# Patient Record
Sex: FEMALE | Race: ASIAN | NOT HISPANIC OR LATINO | Employment: OTHER | URBAN - METROPOLITAN AREA
[De-identification: names, ages, dates, MRNs, and addresses within clinical notes are randomized per-mention and may not be internally consistent; named-entity substitution may affect disease eponyms.]

---

## 2023-07-13 ENCOUNTER — OFFICE VISIT (OUTPATIENT)
Dept: NEUROLOGY | Facility: CLINIC | Age: 73
End: 2023-07-13
Payer: MEDICARE

## 2023-07-13 VITALS — HEART RATE: 80 BPM | SYSTOLIC BLOOD PRESSURE: 116 MMHG | DIASTOLIC BLOOD PRESSURE: 62 MMHG

## 2023-07-13 DIAGNOSIS — R26.9 UNSPECIFIED ABNORMALITIES OF GAIT AND MOBILITY: ICD-10-CM

## 2023-07-13 DIAGNOSIS — M48.07 SPINAL STENOSIS OF LUMBOSACRAL REGION: Primary | ICD-10-CM

## 2023-07-13 PROCEDURE — 99205 OFFICE O/P NEW HI 60 MIN: CPT | Performed by: PSYCHIATRY & NEUROLOGY

## 2023-07-13 RX ORDER — GABAPENTIN 100 MG/1
100 CAPSULE ORAL 3 TIMES DAILY
Qty: 90 CAPSULE | Refills: 5 | Status: SHIPPED | OUTPATIENT
Start: 2023-07-13

## 2023-07-13 RX ORDER — TRAZODONE HYDROCHLORIDE 50 MG/1
TABLET ORAL
COMMUNITY
Start: 2023-06-22

## 2023-07-13 NOTE — ASSESSMENT & PLAN NOTE
Over the past 6 months she has noted subtle changes in handwriting which has become small and sloppy, speech which she feels is a bit softer, and gait. She has imbalance and has fallen 2-3 times over. She describes periods at home where she takes shorter strides and that takes her longer to turn. Symptoms described are suggestive of parkinsonism but this was not confirmed by clinical exam.  There is no signs of rigidity, tremor, or asymmetric bradykinesia. She does not have any history of REM sleep behavior disorder or restless legs. There is been no changes in olfaction. In addition gait and balance issues may be in part related to her lumbar pathology and right lower extremity neuropathic pain. We discussed the clinical diagnosis of Parkinson's disease of which she does not meet at this point. I did recommend she follow-up in a few months to assess for any progression of symptoms that would be more suggestive of an underlying diagnosis of PD. If this were the case then we could consider treatment to assess response or obtaining other adjunctive studies. More recent labs looking at B12, TSH, CMP, myasthenia gravis antibodies   were unremarkable.

## 2023-07-13 NOTE — PROGRESS NOTES
Patient ID: Agustín Lomeli is a 74 y.o. female. Assessment/Plan:    Spinal stenosis of lumbosacral region  Patient with a history of lumbar degenerative disease rating down the right leg for which she is undergone multiple epidural injections with resolution of lower back pain but continued pain in the anterolateral aspect of her right lower calf with involvement of the sole of the foot. Pain is persistent and varies in intensity. Worse with prolonged standing or walking. She has sought a second opinion at [facility] who ordered an EMG study which they wish to obtain locally. We will place order for EMG so that can be obtained locally. This is being done to confirm etiology of symptoms are from lumbar spine. Will start gabapentin 100 mg 3 times daily in effort to control neuropathic pain. Unspecified abnormalities of gait and mobility  Over the past 6 months she has noted subtle changes in handwriting which has become small and sloppy, speech which she feels is a bit softer, and gait. She has imbalance and has fallen 2-3 times over. She describes periods at home where she takes shorter strides and that takes her longer to turn. Symptoms described are suggestive of parkinsonism but this was not confirmed by clinical exam.  There is no signs of rigidity, tremor, or asymmetric bradykinesia. She does not have any history of REM sleep behavior disorder or restless legs. There is been no changes in olfaction. In addition gait and balance issues may be in part related to her lumbar pathology and right lower extremity neuropathic pain. We discussed the clinical diagnosis of Parkinson's disease of which she does not meet at this point. I did recommend she follow-up in a few months to assess for any progression of symptoms that would be more suggestive of an underlying diagnosis of PD. If this were the case then we could consider treatment to assess response or obtaining other adjunctive studies. More recent labs looking at B12, TSH, CMP, myasthenia gravis antibodies   were unremarkable. Diagnoses and all orders for this visit:    Spinal stenosis of lumbosacral region  -     gabapentin (Neurontin) 100 mg capsule; Take 1 capsule (100 mg total) by mouth 3 (three) times a day  -     EMG 2 limb lower extremity; Future    Unspecified abnormalities of gait and mobility  -     EMG 2 limb lower extremity; Future    Other orders  -     traZODone (DESYREL) 50 mg tablet; daily at bedtime       I have spent a total time of 60 minutes on 07/13/23 in caring for this patient including Patient and family education, Impressions, Counseling / Coordination of care, Documenting in the medical record, Reviewing / ordering tests, medicine, procedures   and Obtaining or reviewing history  . Subjective:    Lilian Araujo is a 74year old female who is referred for movement disorder evaluation for gait changes. She has has lower back pain radiating down her right leg for over 5 years. Overtime pain in back resolved but she continues to have pain on the lateral aspect of the right calf and sole foot. Pain is described as severe at times . It is better at rest and worse when walking and standing. She has undergone epidural injections (5 injections) which have not been effective. Physical therapy has not helped. She has noted changes in her gait with shorter stride with short turns and other changes over the past 6 months. She has fallen 2-3 times I over the past 6 months. Speech is soft. No difficulty chewing and swallowing. Dressing and hygiene acts performed independently. Handwriting is micrographic and sloppier. Sleeps well on trazodone. There have been no symptoms of REM sleep behavior disorder. There are no issues with urination. She has mild constipation. There are no experiences with lightheadedness on standing. Cognition is unchanged. She can forget why she entered a room.  There are no changes in olfaction. Objective:    Blood pressure 116/62, pulse 80. Physical Exam  Vitals reviewed. Eyes:      Extraocular Movements: Extraocular movements intact. Pupils: Pupils are equal, round, and reactive to light. Neurological:      Motor: Motor strength is normal.     Deep Tendon Reflexes:      Reflex Scores:       Tricep reflexes are 1+ on the right side and 1+ on the left side. Bicep reflexes are 2+ on the right side and 2+ on the left side. Patellar reflexes are 2+ on the right side and 2+ on the left side. Achilles reflexes are 2+ on the right side and 2+ on the left side. Psychiatric:         Speech: Speech normal.         Neurological Exam  Mental Status   Oriented to person, place, time and situation. Recent and remote memory are intact. Speech is normal. Follows complex commands. Attention and concentration are normal.    Cranial Nerves  CN III, IV, VI: Extraocular movements intact bilaterally. Pupils equal round and reactive to light bilaterally. CN VII: Full and symmetric facial movement. CN VIII: Hearing is normal.  CN IX, X: Palate elevates symmetrically  CN XI: Shoulder shrug strength is normal.  CN XII: Tongue midline without atrophy or fasciculations. Motor   Normal muscle tone. Strength is 5/5 throughout all four extremities. Sensory  Light touch is normal in upper and lower extremities. Pinprick is normal in upper and lower extremities. Reflexes                                            Right                      Left  Biceps                                 2+                         2+  Triceps                                1+                         1+  Patellar                                2+                         2+  Achilles                                2+                         2+    Right pathological reflexes: Ankle clonus absent. Left pathological reflexes: Ankle clonus absent.     Coordination  Right: Finger-to-nose normal. Rapid alternating movement normal.Left: Finger-to-nose normal. Rapid alternating movement normal.  Finger taps, hand , rapid alternating movements, and heel taps were normal in amplitude without decrement. Mild decrement with right foot taps 1. No rest or action tremor. .    Gait   Able to rise from chair without using arms. Good stride length. No en bloc turn. No freezing or festination. .        ROS:    Review of Systems   Constitutional: Negative for appetite change, fatigue and fever. HENT: Negative. Negative for hearing loss, tinnitus, trouble swallowing and voice change. Eyes: Negative. Negative for photophobia, pain and visual disturbance. Respiratory: Negative. Negative for shortness of breath. Cardiovascular: Negative. Negative for palpitations. Gastrointestinal: Negative. Negative for nausea and vomiting. Endocrine: Negative. Negative for cold intolerance. Genitourinary: Negative. Negative for dysuria, frequency and urgency. Musculoskeletal: Positive for gait problem ([place] steps). Negative for back pain, myalgias and neck pain. Balance Issues  Pain in Legs  Falls     Skin: Negative. Negative for rash. Allergic/Immunologic: Negative. Neurological: Positive for dizziness (Sometimes), weakness (Hands) and numbness. Negative for tremors, seizures, syncope, facial asymmetry, speech difficulty, light-headedness and headaches. Handwriting has gotten worse     Hematological: Negative. Does not bruise/bleed easily. Psychiatric/Behavioral: Negative. Negative for confusion, hallucinations and sleep disturbance. All other systems reviewed and are negative.

## 2023-07-13 NOTE — ASSESSMENT & PLAN NOTE
Patient with a history of lumbar degenerative disease rating down the right leg for which she is undergone multiple epidural injections with resolution of lower back pain but continued pain in the anterolateral aspect of her right lower calf with involvement of the sole of the foot. Pain is persistent and varies in intensity. Worse with prolonged standing or walking. She has sought a second opinion at [facility] who ordered an EMG study which they wish to obtain locally. We will place order for EMG so that can be obtained locally. This is being done to confirm etiology of symptoms are from lumbar spine. Will start gabapentin 100 mg 3 times daily in effort to control neuropathic pain.

## 2023-08-01 ENCOUNTER — HOSPITAL ENCOUNTER (OUTPATIENT)
Dept: NEUROLOGY | Facility: CLINIC | Age: 73
Discharge: HOME | End: 2023-08-01
Payer: MEDICARE

## 2023-08-01 DIAGNOSIS — M48.07 SPINAL STENOSIS OF LUMBOSACRAL REGION: ICD-10-CM

## 2023-08-01 DIAGNOSIS — R26.9 UNSPECIFIED ABNORMALITIES OF GAIT AND MOBILITY: ICD-10-CM

## 2023-08-01 PROCEDURE — 95910 NRV CNDJ TEST 7-8 STUDIES: CPT | Performed by: PSYCHIATRY & NEUROLOGY

## 2023-08-01 PROCEDURE — 95886 MUSC TEST DONE W/N TEST COMP: CPT | Performed by: PSYCHIATRY & NEUROLOGY

## 2023-11-07 ENCOUNTER — TELEPHONE (OUTPATIENT)
Dept: NEUROLOGY | Facility: CLINIC | Age: 73
End: 2023-11-07

## 2023-11-08 NOTE — TELEPHONE ENCOUNTER
Called pt. She placed her , Herminia Howard, on the phone. Herminia Howard stated that pt fell down twice in the past 2 weeks. No injury. Does not walk with an assistive device. Pt feels unsteady on her feet. Loses balance when she goes to make a turn. Denies dizziness. Denies tremors. Pt stopped taking gabapentin. Stated that it did not help, and it made her feel weak. Stopped the med after one month. Pt's lumbar pain has improved with physical therapy. If she develops back pain, she rests and the pain subsides. Pt verbalized to her  that she is afraid she may be having "mini strokes". Hreminia Howard would like pt evaluated by Dr. Angus Spurling. Routed to provider to advise if an OV is recommended, and how soon pt needs to be seen.

## 2023-11-08 NOTE — TELEPHONE ENCOUNTER
Sourav Fletcher MD  Neurology [facility] Clinical Team 452 minutes ago (12:27 PM)       History provided is not suggestive of mini stroke. Is she having new neurological symptoms? If this is the case leading her to believe she is having a stroke then she should be seen in ER. Patient is scheduled to follow up with Stephy Lantigua in 4 weeks to reassess gait and balance. I do think this is reasonable. She could be placed on waiting list should Stephy Pacer have a sooner opening. Unfortunately I am unable to fit her into my schedule sooner.

## 2023-11-08 NOTE — TELEPHONE ENCOUNTER
Spoke with pt's  and made him aware of provider's response. Reviewed signs/symptoms of stroke, and when to report to the ED. Pt is not having any new neurological symptoms. Pt will keep the scheduled OV with Fabien Perales at this time.

## 2023-12-07 ENCOUNTER — OFFICE VISIT (OUTPATIENT)
Dept: NEUROLOGY | Facility: CLINIC | Age: 73
End: 2023-12-07
Payer: MEDICARE

## 2023-12-07 VITALS
BODY MASS INDEX: 22 KG/M2 | DIASTOLIC BLOOD PRESSURE: 78 MMHG | HEART RATE: 61 BPM | OXYGEN SATURATION: 98 % | WEIGHT: 124.2 LBS | SYSTOLIC BLOOD PRESSURE: 114 MMHG | TEMPERATURE: 97.8 F

## 2023-12-07 DIAGNOSIS — R26.9 UNSPECIFIED ABNORMALITIES OF GAIT AND MOBILITY: Primary | ICD-10-CM

## 2023-12-07 PROCEDURE — 99215 OFFICE O/P EST HI 40 MIN: CPT | Performed by: PHYSICIAN ASSISTANT

## 2023-12-07 NOTE — ASSESSMENT & PLAN NOTE
Patient with slowly progressive gait issues over the past year. She states she has been having issues with shuffling of gait, decreased stride, slow turns and falls. She also describes some changes in her voice as well as her handwriting over the past year. On exam today once again she had no evidence of bradykinesia, rigidity or tremor. She does however have postural instability with some en bloc turns. She is working with physical therapy and has had improvement of her gait and was able to walk with an overall normal stride length in the office today.  however does state that often she will still shuffle at home. Although her subjective symptoms are suggestive of a parkinsonism, she does not fit the clinical picture for Parkinson's based on her exam.  We once again had a long discussion in regards to the diagnosis of Parkinson's disease as well as its progression and clinical findings. She does have imbalance however she does not have any other clinical symptoms to diagnose Parkinson's disease at this time. There is no history of REM sleep disorder, restless legs or changes in olfaction. Certainly she does have some underlying low back pathology that could account for some of her complaints although her recent EMG was normal.  She does on exam has some decreased vibration in the right lower extremity. She is working with pain management and per the  she may be undergoing a surgical procedure for lumbar radiculopathy. We will have her follow-up in the office in a few months to assess for any further progression. Certainly if there is were an underlying parkinsonism we would expect to see progression of symptoms with time. In the interim I have asked that she continue to work with physical therapy for her balance and gait. Will also continue to follow with pain management.

## 2023-12-07 NOTE — PROGRESS NOTES
Patient ID: Diogenes Johnston is a 74 y.o. female. Assessment/Plan:    Unspecified abnormalities of gait and mobility  Patient with slowly progressive gait issues over the past year. She states she has been having issues with shuffling of gait, decreased stride, slow turns and falls. She also describes some changes in her voice as well as her handwriting over the past year. On exam today once again she had no evidence of bradykinesia, rigidity or tremor. She does however have postural instability with some en bloc turns. She is working with physical therapy and has had improvement of her gait and was able to walk with an overall normal stride length in the office today.  however does state that often she will still shuffle at home. Although her subjective symptoms are suggestive of a parkinsonism, she does not fit the clinical picture for Parkinson's based on her exam.  We once again had a long discussion in regards to the diagnosis of Parkinson's disease as well as its progression and clinical findings. She does have imbalance however she does not have any other clinical symptoms to diagnose Parkinson's disease at this time. There is no history of REM sleep disorder, restless legs or changes in olfaction. Certainly she does have some underlying low back pathology that could account for some of her complaints although her recent EMG was normal.  She does on exam has some decreased vibration in the right lower extremity. She is working with pain management and per the  she may be undergoing a surgical procedure for lumbar radiculopathy. We will have her follow-up in the office in a few months to assess for any further progression. Certainly if there is were an underlying parkinsonism we would expect to see progression of symptoms with time. In the interim I have asked that she continue to work with physical therapy for her balance and gait.   Will also continue to follow with pain management. Subjective:    Margy Cerna is a 74year old female who presents in follow up for gait changes. To review, she has had lower back pain radiating down her right leg for over 5 years. Overtime pain in back resolved but she continues to have pain on the lateral aspect of the right calf and sole foot. Pain is described as severe at times. It is better at rest and worse when walking and standing. She has undergone epidural injections (5 injections) which have not been effective. Physical therapy has not helped. She has noted changes in her gait with shorter stride with short turns and other changes over the past 6 months. No issues with urination. Labs looking at B12, TSH, CMP, myasthenia gravis antibodies were unremarkable. At her initial visit with Dr Francois Lopez she was started in Neurontin for her continued radicular leg pain. She had some changes in speech, handwriting and gait which were suggestive of PD however she did not fit the criteria on exam.  She presents today for a re-evaluation. EWG was NORMAL.      INTERVAL HISTORY:  She stopped the Neurontin given NO benefit ad made her feel weak   She is having more issues with falls, she will feel off balance at times   She shuffles when walking   She struggles more when getting up out of the chair   She takes very little steps when making turns  Gait issues started about a year ago and are getting worse   She also feels that her voice is changing, this is getting weaker   She has low energy, this seems to get worse as the day goes on   Handwriting getting smaller for her   No tremors   She can still perform her ADLs she can dress and shower on her own   No issues with swallowing   Appetite is good   No changes in her smell  She sleeps well with the Trazodone   No yelling or thrashing in sleep   No hallucinations   Memory is still good   No known family history of PD or gait issues   She had an MRI of the brain in [facility], per the  this was normal other than some microvascular changes     Her pain is much better controlled with her last AARTI  She does still get some tingling in the right right leg at times   The right leg pain is worse with walking and standing   She may be undergoing a surgery for this per the        I personally reviewed and updated the ROS. Objective:    Blood pressure 114/78, pulse 61, temperature 97.8 °F (36.6 °C), temperature source Temporal, weight 56.3 kg (124 lb 3.2 oz), SpO2 98 %. Physical Exam  Constitutional:       Appearance: Normal appearance. Eyes:      Extraocular Movements: Extraocular movements intact. Pupils: Pupils are equal, round, and reactive to light. Pulmonary:      Effort: Pulmonary effort is normal.   Neurological:      Mental Status: She is alert. Motor: Motor strength is normal.     Deep Tendon Reflexes:      Reflex Scores:       Patellar reflexes are 2+ on the right side and 2+ on the left side. Achilles reflexes are 2+ on the right side and 2+ on the left side. Psychiatric:         Speech: Speech normal.         Neurological Exam  Mental Status  Alert. Oriented to person, place, time and situation. Recent and remote memory are intact. Speech is normal. Follows complex commands. Attention and concentration are normal.    Cranial Nerves  CN III, IV, VI: Extraocular movements intact bilaterally. Pupils equal round and reactive to light bilaterally. CN VII: Full and symmetric facial movement. CN VIII: Hearing is normal.  CN IX, X: Palate elevates symmetrically  CN XI: Shoulder shrug strength is normal.  CN XII: Tongue midline without atrophy or fasciculations. Motor   Normal muscle tone. Strength is 5/5 throughout all four extremities. Sensory  Light touch is normal in upper and lower extremities. Pinprick is normal in upper and lower extremities. Decreased vibration at the knee on the right.     Reflexes                                            Right Left  Patellar                                2+                         2+  Achilles                                2+                         2+    Right pathological reflexes: Ankle clonus absent. Left pathological reflexes: Ankle clonus absent. Glabellar tap absent. Coordination  Right: Finger-to-nose normal. Rapid alternating movement normal.Left: Finger-to-nose normal. Rapid alternating movement normal.    .    Gait   Able to rise from chair without using arms. Good stride length. Slight en bloc turn. No freezing or festination. .                                  Date of exam:  12/7/23         Speech  0    Facial Expression  0    Rigidity - Neck  0    Rigidity - Upper Extremity (Right)  0    Rigidity - Upper Extremity (Left)   0    Rigidity - Lower Extremity (Right)  0    Rigidity - Lower Extremity (Left)   0    Finger Taps (Right)   0    Finger Taps (Left)   0    Hand  (Right)  0    Hand  (Left)   0    Pronation/Supination (Right)  0    Pronation/Supination (Left)   0    Heel Taps (Right) 0    Heel Taps(Left) 0    Arising from Chair   0    Gait   1    Postural Stability   0    Posture 0    Global spontaneity of movement 0    Postural Tremor (Right) 0    Postural Tremor (Left) 0    Kinetic Tremor (Right)  0    Kinetic Tremor (Left)  0    Rest tremor  RUE 0    Rest tremor  LUE 0    Rest tremor  RLE 0    Reset tremor  LLE 0    Lip/Jaw Tremor  0    Motor Exam Total:          ROS:    Review of Systems

## 2023-12-07 NOTE — PROGRESS NOTES
Review of Systems   Constitutional:  Positive for appetite change and fatigue (increases throughout day). Negative for fever. HENT: Negative. Negative for hearing loss, tinnitus, trouble swallowing and voice change. Eyes: Negative. Negative for photophobia, pain and visual disturbance. Respiratory: Negative. Negative for shortness of breath. Cardiovascular: Negative. Negative for palpitations. Gastrointestinal: Negative. Negative for nausea and vomiting. Endocrine: Negative. Negative for cold intolerance. Genitourinary: Negative. Negative for dysuria, frequency and urgency. Musculoskeletal:  Positive for gait problem (balance issues, shuffling). Negative for back pain, myalgias and neck pain. Skin: Negative. Negative for rash. Allergic/Immunologic: Negative. Neurological:  Positive for weakness (both legs). Negative for dizziness, tremors, seizures, syncope, facial asymmetry, speech difficulty, light-headedness, numbness and headaches. Hematological: Negative. Does not bruise/bleed easily. Psychiatric/Behavioral: Negative. Negative for confusion, hallucinations and sleep disturbance.

## 2024-02-13 ENCOUNTER — TELEPHONE (OUTPATIENT)
Dept: NEUROLOGY | Facility: CLINIC | Age: 74
End: 2024-02-13

## 2024-02-13 NOTE — TELEPHONE ENCOUNTER
"Spoke with pt. She states that she feels that her feet are \"screwed into\" the ground. She has a difficult time moving them. When she does move, she is very unstable and feels like she has to fall. Pt's  said she nearly had a fall today. This message was already routed to provider to advise regarding an OV.  "

## 2024-02-13 NOTE — TELEPHONE ENCOUNTER
Hello Good Morning,     Patient's  has called back and is having new issues, and per  worsening symptoms.     Patient's  would like  call back [phone].    Patient is looking to be seen by  sooner than scheduled and is asking for even a vv , can we offer the hold on 02/20/2024, 9 am VV Hold ?    Thank you in advance,     Emily

## 2024-02-14 NOTE — TELEPHONE ENCOUNTER
Carline Navarro MD  You; Neurology [place] Clinical Team 41 hour ago (8:12 AM)       Can offer virtual visit but they have to be able to have the space and ability to have her walk on camera to evaluate the change in gait they are describing.

## 2024-02-14 NOTE — TELEPHONE ENCOUNTER
2/13 9:03 (2:04 - no transcription    Pt's  left a VM re: pt's worsening condition.    Already address.

## 2024-02-14 NOTE — TELEPHONE ENCOUNTER
2/12 at 2:39 pm:    I am calling for my wife Daisha Moctezuma. Date of birth May 28th, 1950. We saw DR. Fatima and her assistant. We were advised to call her back if anything happens. Recently my wife has fallen down several times. It was not serious but concerning. The reason she said she fell down is because she cannot move her foot on time. So this is an issue and she just fall down. Please call me back.  ______________________________________________    Already addressed. OV is scheduled.

## 2024-02-15 NOTE — TELEPHONE ENCOUNTER
received vm from 2/13 at 11:59am-2 min 24 sec vm,  no dictation  pt's  left vm returning our call. pt having worsening symptoms/falls. would like to see dr lawrence  [phone]  ----------------------------------------  already addressed

## 2024-02-19 ENCOUNTER — TELEPHONE (OUTPATIENT)
Dept: NEUROLOGY | Facility: CLINIC | Age: 74
End: 2024-02-19

## 2024-02-19 NOTE — TELEPHONE ENCOUNTER
Patient was scheduled for an OVS with Dr. Fatima tomorrow at 9am. LMOM telling patient that since Dr. Fatima is not in office on Tuesdays, she can either have a virtual visit with Dr. Fatima at her scheduled time tomorrow, or she can reschedule the in office visit. I told her that we currently have two openings the following day (Wednesday). Asked patient to please give our office a call at [phone] to let us know what she would like to do. I changed the appointment to a virtual visit for the time being.

## 2024-02-20 ENCOUNTER — TELEMEDICINE (OUTPATIENT)
Dept: NEUROLOGY | Facility: CLINIC | Age: 74
End: 2024-02-20
Payer: MEDICARE

## 2024-02-20 DIAGNOSIS — G20.C PRIMARY PARKINSONISM: Primary | ICD-10-CM

## 2024-02-20 PROCEDURE — 99215 OFFICE O/P EST HI 40 MIN: CPT | Performed by: PSYCHIATRY & NEUROLOGY

## 2024-02-20 RX ORDER — ALPRAZOLAM 1 MG/1
TABLET ORAL AS NEEDED
COMMUNITY

## 2024-02-20 NOTE — PATIENT INSTRUCTIONS
Gait has significantly progressed since initially seen in July 2023.  She now has increased shuffling with freezing on initiation and on turns.  No tremor, unable to assess rigidity via virtual visit, perhaps slight subtle decrement on right toe taps.  Time spent discussing diagnosis of parkinsonism.  Differential is an atypical initial presentation of idiopathic Parkinson disease versus atypical parkinsonism given prominent gait and freezing issues at onset.  We discussed differential for atypical parkinsonism including primary freezing of gait versus early symptoms of PSP.    Will start a trial of levodopa.  Instructed to take carbidopa/levodopa 25/100 half a tablet 3 times daily (every 6 hours apart from a.m. dose) x 1 week then 1 tablet 3 times daily x 1 week then 1.5 tablets 3 times daily.  Will reassess on medication.  If she develops any side effects they will contact the office.  If she develops nausea she is to take the medication with food.  Potential side effects such as nausea, hallucinations, lightheadedness and reviewed.

## 2024-02-20 NOTE — PROGRESS NOTES
Virtual Regular Visit    Verification of patient location:    Patient is located at Home in the following state in which I hold an active license [place]      Assessment/Plan:    Problem List Items Addressed This Visit       Primary parkinsonism - Primary     Gait has significantly progressed since initially seen in July 2023.  She now has increased shuffling with freezing on initiation and on turns.  No tremor, unable to assess rigidity via virtual visit, perhaps slight subtle decrement on right toe taps.  Time spent discussing diagnosis of parkinsonism.  Differential is an atypical initial presentation of idiopathic Parkinson disease versus atypical parkinsonism given prominent gait and freezing issues at onset.  We discussed differential for atypical parkinsonism including primary freezing of gait versus early symptoms of PSP. MRI brain already scheduled for March.      Will start a trial of levodopa.  Instructed to take carbidopa/levodopa 25/100 half a tablet 3 times daily (every 6 hours apart from a.m. dose) x 1 week then 1 tablet 3 times daily x 1 week then 1.5 tablets 3 times daily.  Will reassess on medication.  If she develops any side effects they will contact the office.  If she develops nausea she is to take the medication with food.  Potential side effects such as nausea, hallucinations, lightheadedness and reviewed.         Relevant Medications    carbidopa-levodopa (SINEMET)  mg per tablet            Reason for visit is   Chief Complaint   Patient presents with    Virtual Regular Visit          Encounter provider Carline Navarro MD    Provider located at [facility] [place] [address] [address]  [address]  [place] [address] [phone]      Recent Visits  Date Type Provider Dept   02/19/24 Telephone Carline Navarro MD  [facility]   Showing recent visits within past 7 days and meeting all other requirements  Today's Visits  Date Type  Provider Dept   02/20/24 Telemedicine Carline Navarro MD [facility]   Showing today's visits and meeting all other requirements  Future Appointments  No visits were found meeting these conditions.  Showing future appointments within next 150 days and meeting all other requirements       The patient was identified by name and date of birth. Daisha Moctezuma was informed that this is a telemedicine visit and that the visit is being conducted through the Epic Embedded platform. She agrees to proceed..  My office door was closed. No one else was in the room.  She acknowledged consent and understanding of privacy and security of the video platform. The patient has agreed to participate and understands they can discontinue the visit at any time.    Patient is aware this is a billable service.     Subjective  Daisha Moctezuma is a 73 y.o. female      who presents for follow up for worsening gait.   To review, lower back pain radiating down her right leg present for over 5 years. Overtime pain in back resolved but she continues to have pain on the lateral aspect of the right calf and sole foot which fluctuates in intensity, improves with and worse when walking and standing. Epidural injections (5 injections) have not been effective. Physical therapy has not helped. She has noted changes in her gait with shorter stride with short turns. No issues with urination or cognition. Labs looking at B12, TSH, CMP, myasthenia gravis antibodies were unremarkable.   Initially seen 7/2023 and started on gabapentin for pain. She has mild hypophonia, reported micrographia but no rigidity, tremor, or asymmetric bradykinesia. No history of REM sleep behavior disorder, restless legs syndrome or anosmia. We were to follow for signs of progression more suggestive of parkinsonism. EMG of right leg unremarkable. Seen in office in Dec with no clear progression.   They called with increased falls and symptoms and is being seen today for  more urgent follow up virtually.  She has had more difficulty with gait over the past couple months. She has developed freezing of her foot particularly when in narrow spaces such as her kitchen.  More commonly with the right foot but also occurs on the left.  This has been associated with falls.  She finds she has to grasp onto counters to maintain herself upright.  She did undergo physical therapy for gait and balance but this has ended.  She does try to walk 30 minutes daily.  She was seen by a [facility]Neurologist and testing including VOG was normal.  They to suspected early parkinsonism.  MRI of the brain has been scheduled for March.           Past Medical History:   Diagnosis Date    Arthritis     GERD (gastroesophageal reflux disease)     Other hyperlipidemia     Spinal stenosis of lumbosacral region        History reviewed. No pertinent surgical history.    Current Outpatient Medications   Medication Sig Dispense Refill    ALPRAZolam (Xanax) 1 mg tablet Take by mouth if needed for anxiety      atorvastatin (LIPITOR) 10 mg tablet Take 10 mg by mouth daily      Calcium Carbonate Antacid (TUMS PO) Take by mouth if needed      carbidopa-levodopa (SINEMET)  mg per tablet Take 0.5 tablets by mouth 3 (three) times a day for 7 days, THEN 1 tablet 3 (three) times a day for 7 days, THEN 1.5 tablets 3 (three) times a day. Take 6 hours apart from am dose. 150 tablet 5    esomeprazole (NexIUM) 20 mg capsule Take 20 mg by mouth every morning before breakfast      Multiple Vitamin (multivitamin) capsule Take 1 capsule by mouth daily      VITAMIN D PO Take by mouth      gabapentin (Neurontin) 100 mg capsule Take 1 capsule (100 mg total) by mouth 3 (three) times a day (Patient not taking: Reported on 12/7/2023) 90 capsule 5    omeprazole (PriLOSEC) 20 mg delayed release capsule Take 20 mg by mouth daily (Patient not taking: Reported on 12/7/2023)      traZODone (DESYREL) 50 mg tablet daily at  bedtime (Patient not taking: Reported on 2/20/2024)       No current facility-administered medications for this visit.        No Known Allergies    Review of Systems   Constitutional:  Negative for appetite change, fatigue and fever.   HENT:  Positive for voice change (weaker). Negative for hearing loss, tinnitus and trouble swallowing.    Eyes: Negative.  Negative for photophobia, pain and visual disturbance.   Respiratory: Negative.  Negative for shortness of breath.    Cardiovascular: Negative.  Negative for palpitations.   Gastrointestinal: Negative.  Negative for nausea and vomiting.   Endocrine: Negative.  Negative for cold intolerance.   Genitourinary: Negative.  Negative for dysuria, frequency and urgency.   Musculoskeletal:  Positive for gait problem (Freezing Issues , shuffling Feet and Stumbles). Negative for back pain, myalgias, neck pain and neck stiffness.        Falls  Stiffness in Legs Right side more so than Left     Skin: Negative.  Negative for rash.   Allergic/Immunologic: Negative.    Neurological:  Positive for weakness (Legs). Negative for dizziness, tremors, seizures, syncope, facial asymmetry, speech difficulty, light-headedness, numbness and headaches.   Hematological: Negative.  Does not bruise/bleed easily.   Psychiatric/Behavioral:  Positive for sleep disturbance. Negative for confusion and hallucinations.    All other systems reviewed and are negative.      Video Exam    There were no vitals filed for this visit.    Physical Exam   Mental status: Patient is awake, alert and oriented x 4, Follows commands. Normal speech.   Cranial Nerves:   CN III-VI- extra ocular muscles intact   CN VII- symmetric facial movements  CN VIII- normal to voice   CN IX- symmetric shoulder shrug   CN XII- midline tongue   Motor: no pronator drift  Coordination: No tremor with hands outstretched. No intentional tremor. No decrement on finger taps, handgrip or KAREEM. Perhaps slight decrement on right toe taps.  Normal heel taps. No rest tremor.    Gait:  Shortened stride, Freezing on initiation and on turn. Turns holding onto counter in kitchen.  Mildly stooped.      Visit Time  Total Visit Duration: 32 minutes. Additional 9 minutes on review of chart and documentation.

## 2024-02-20 NOTE — ASSESSMENT & PLAN NOTE
Gait has significantly progressed since initially seen in July 2023.  She now has increased shuffling with freezing on initiation and on turns.  No tremor, unable to assess rigidity via virtual visit, perhaps slight subtle decrement on right toe taps.  Time spent discussing diagnosis of parkinsonism.  Differential is an atypical initial presentation of idiopathic Parkinson disease versus atypical parkinsonism given prominent gait and freezing issues at onset.  We discussed differential for atypical parkinsonism including primary freezing of gait versus early symptoms of PSP. MRI brain already scheduled for March.      Will start a trial of levodopa.  Instructed to take carbidopa/levodopa 25/100 half a tablet 3 times daily (every 6 hours apart from a.m. dose) x 1 week then 1 tablet 3 times daily x 1 week then 1.5 tablets 3 times daily.  Will reassess on medication.  If she develops any side effects they will contact the office.  If she develops nausea she is to take the medication with food.  Potential side effects such as nausea, hallucinations, lightheadedness and reviewed.

## 2024-04-02 ENCOUNTER — TELEMEDICINE (OUTPATIENT)
Dept: NEUROLOGY | Facility: CLINIC | Age: 74
End: 2024-04-02
Payer: MEDICARE

## 2024-04-02 DIAGNOSIS — G20.C PRIMARY PARKINSONISM: Primary | ICD-10-CM

## 2024-04-02 PROCEDURE — 99213 OFFICE O/P EST LOW 20 MIN: CPT | Performed by: PSYCHIATRY & NEUROLOGY

## 2024-04-02 NOTE — PATIENT INSTRUCTIONS
Over 5-year history of chronic lower back pain rating into the leg.  Development of a parkinsonian gait with freezing early 2024.  Differential is an atypical initial presentation of idiopathic Parkinson disease versus atypical parkinsonism given prominent gait and freezing issues at onset.  We discussed differential for atypical parkinsonism including primary freezing of gait versus early symptoms of PSP. MRI brain reviewed and was unremarkable other than's microangiopathy.  No evidence of hydrocephalus.    She did not notice any improvement on levodopa until she reached 25/100 2 tablets 3 times daily.  On this dose she has had reduction in overall sensation of weakness, improved gait, but still has freezing of gait on initiation with turns.    Time once again spent discussing current clinical presentation of primary freezing of gait.  Given partial responsive numbness to levodopa we discussed the option of further increases.  We opted to increase to carbidopa/levodopa 2 tablets q. 8 AM, 2.5 tablets at 1 PM, and 2 tablets at 6 PM.    Would benefit from physical therapy for parkinsonian gait specifically ways to combat freezing of gait.  They have asked for referral to be sent to [facility].  Will start a trial of levodopa.  Instructed to take carbidopa/levodopa 25/100 half a tablet 3 times daily (every 6 hours apart from a.m. dose) x 1 week then 1 tablet 3 times daily x 1 week then 1.5 tablets 3 times daily.  Will reassess on medication.  If she develops any side effects they will contact the office.  If she develops nausea she is to take the medication with food.  Potential side effects such as nausea, hallucinations, lightheadedness and reviewed.

## 2024-04-02 NOTE — PROGRESS NOTES
Virtual Regular Visit    Verification of patient location:    Patient is located at Home in the following state in which I hold an active license [place]      Assessment/Plan:    Problem List Items Addressed This Visit       Primary parkinsonism - Primary      Over 5-year history of chronic lower back pain rating into the leg.  Development of a parkinsonian gait with freezing early 2024.  Differential is an atypical initial presentation of idiopathic Parkinson disease versus atypical parkinsonism given prominent gait and freezing issues at onset.  We discussed differential for atypical parkinsonism including primary freezing of gait versus early symptoms of PSP. MRI brain reviewed and was unremarkable other than's microangiopathy.  No evidence of hydrocephalus.    She did not notice any improvement on levodopa until she reached 25/100 2 tablets 3 times daily.  On this dose she has had reduction in overall sensation of weakness, improved gait, but still has freezing of gait on initiation with turns.    Time once again spent discussing current clinical presentation of primary freezing of gait.  Given partial responsive numbness to levodopa we discussed the option of further increases.  We opted to increase to carbidopa/levodopa 2 tablets q. 8 AM, 2.5 tablets at 1 PM, and 2 tablets at 6 PM.    Would benefit from physical therapy for parkinsonian gait specifically ways to combat freezing of gait.  They have asked for referral to be sent to [facility].  Will start a trial of levodopa.  Instructed to take carbidopa/levodopa 25/100 half a tablet 3 times daily (every 6 hours apart from a.m. dose) x 1 week then 1 tablet 3 times daily x 1 week then 1.5 tablets 3 times daily.  Will reassess on medication.  If she develops any side effects they will contact the office.  If she develops nausea she is to take the medication with food.  Potential side effects such as nausea, hallucinations, lightheadedness  and reviewed.                 Reason for visit is   Chief Complaint   Patient presents with    Virtual Regular Visit          Encounter provider Carline Navarro MD    Provider located at [facility] [place] [address] [address]  [address]  [facility] [place] [address] [phone]      Recent Visits  No visits were found meeting these conditions.  Showing recent visits within past 7 days and meeting all other requirements  Today's Visits  Date Type Provider Dept   04/02/24 Telemedicine Carline Navarro MD  [facility]   Showing today's visits and meeting all other requirements  Future Appointments  No visits were found meeting these conditions.  Showing future appointments within next 150 days and meeting all other requirements       The patient was identified by name and date of birth. Daisha Moctezuma was informed that this is a telemedicine visit and that the visit is being conducted through the Epic Embedded platform. She agrees to proceed..  My office door was closed. No one else was in the room.  She acknowledged consent and understanding of privacy and security of the video platform. The patient has agreed to participate and understands they can discontinue the visit at any time.    Patient is aware this is a billable service.     Subjective  Daisha Moctezuma is a 73 y.o. female   who presents for follow up for parkinsonian gait with freezing.     Last seen virtually with shuffling and progression of gait difficulties since initially seen in July 2023 with the development of shuffling and freezing on initiation and turns.  Exam with perhaps slight subtle decrement on right toe taps. Differential is an atypical initial presentation of idiopathic Parkinson disease versus atypical parkinsonism given prominent gait and freezing issues at onset.  We discussed differential for atypical parkinsonism including primary freezing of gait versus early symptoms of PSP.  She was  started on carbidopa/levodopa with instructions to slowly increase to 1.5 tablets 3 times daily.  She presents today for evaluation on medication.       Patient reports slowly increasing carbidopa/levodopa as instructed.  On 1 and half tablets 3 times a day she had noticed no changes therefore she further increase to 2 tablets 3 times a day.  On this dose she has noted 70 to 80% improvement in previously reported weakness.  She does still fall due to freezing.  Is having difficulty sleeping which has improved on trazodone.  There have been no side effects of levodopa.  No new parkinsonian symptoms.  She denies any changes in speech, swallowing, hand dexterity.      Review of initial history: Lower back pain radiating down her right leg present for over 5 years. Overtime pain in back resolved but she continues to have pain on the lateral aspect of the right calf and sole foot which fluctuates in intensity, improves with and worse when walking and standing. Epidural injections (5 injections) have not been effective. Physical therapy has not helped.  More recent change in gait with shorter stride with short turns. No issues with urination or cognition. Labs looking at B12, TSH, CMP, myasthenia gravis antibodies were unremarkable.  No history of REM sleep behavior disorder, restless legs syndrome or anosmia. EMG of right leg unremarkable. Seen in office in Dec 2023 with no clear progression.  Parkinsonian gait with freezing first noted at the February 2024 appointment. She was seen by a [facility]Neurologist and testing including VOG was normal.           Past Medical History:   Diagnosis Date    Arthritis     GERD (gastroesophageal reflux disease)     Other hyperlipidemia     Spinal stenosis of lumbosacral region        History reviewed. No pertinent surgical history.    Current Outpatient Medications   Medication Sig Dispense Refill    ALPRAZolam (Xanax) 1 mg tablet Take by mouth if needed for  anxiety      atorvastatin (LIPITOR) 10 mg tablet Take 10 mg by mouth daily      Calcium Carbonate Antacid (TUMS PO) Take by mouth if needed      carbidopa-levodopa (SINEMET)  mg per tablet Take 0.5 tablets by mouth 3 (three) times a day for 7 days, THEN 1 tablet 3 (three) times a day for 7 days, THEN 1.5 tablets 3 (three) times a day. Take 6 hours apart from am dose. (Patient taking differently: 2 tablets 3 (three) times a day. Take 6 hours apart from am dose.) 150 tablet 5    esomeprazole (NexIUM) 20 mg capsule Take 20 mg by mouth 2 (two) times a day      MELATONIN PO Take by mouth      Multiple Vitamin (multivitamin) capsule Take 1 capsule by mouth daily      traZODone (DESYREL) 50 mg tablet 25 mg daily at bedtime      VITAMIN D PO Take by mouth      gabapentin (Neurontin) 100 mg capsule Take 1 capsule (100 mg total) by mouth 3 (three) times a day (Patient not taking: Reported on 12/7/2023) 90 capsule 5    omeprazole (PriLOSEC) 20 mg delayed release capsule Take 20 mg by mouth daily (Patient not taking: Reported on 12/7/2023)       No current facility-administered medications for this visit.        No Known Allergies    Review of Systems   Constitutional:  Negative for appetite change, fatigue and fever.   HENT:  Positive for voice change (has stayed the same). Negative for hearing loss, tinnitus and trouble swallowing.    Eyes: Negative.  Negative for photophobia, pain and visual disturbance.   Respiratory: Negative.  Negative for shortness of breath.    Cardiovascular: Negative.  Negative for palpitations.   Gastrointestinal: Negative.  Negative for nausea and vomiting.   Endocrine: Negative.  Negative for cold intolerance.   Genitourinary: Negative.  Negative for dysuria, frequency and urgency.   Musculoskeletal:  Positive for gait problem (Has gotten better). Negative for back pain, myalgias, neck pain and neck stiffness.        Falls  Hard to begin foot movement  Stiffness has gotten better   Skin:  Negative.  Negative for rash.   Allergic/Immunologic: Negative.    Neurological:  Negative for dizziness, tremors, seizures, syncope, facial asymmetry, speech difficulty, weakness, light-headedness, numbness and headaches.   Hematological: Negative.  Does not bruise/bleed easily.   Psychiatric/Behavioral:  Positive for sleep disturbance (Started Melatonin again). Negative for confusion and hallucinations.    All other systems reviewed and are negative.      Video Exam    There were no vitals filed for this visit.    Physical Exam   Mental status: Patient is awake, alert and oriented. Follows commands. Normal speech.   Cranial Nerves:   CN III-VI- extra ocular muscles intact   CN VII- symmetric facial movements  CN VIII- normal to voice  CN IX- symmetric shoulder shrug   CN XII- midline tongue   Motor: no pronator drift  Coordination: No tremor with hands outstretched. No intentional tremor on nose to extended arm bilaterally. No decrement on finger taps, handgrips, KAREEM.   Gait:  Freezing on initiation and turns. Mildly reduced stride. Improved balance, able to ambulate and turn without holding objects     Visit Time  Total Visit Duration: 25 minutes

## 2024-04-02 NOTE — ASSESSMENT & PLAN NOTE
Over 5-year history of chronic lower back pain rating into the leg.  Development of a parkinsonian gait with freezing early 2024.  Differential is an atypical initial presentation of idiopathic Parkinson disease versus atypical parkinsonism given prominent gait and freezing issues at onset.  We discussed differential for atypical parkinsonism including primary freezing of gait versus early symptoms of PSP. MRI brain reviewed and was unremarkable other than's microangiopathy.  No evidence of hydrocephalus.    She did not notice any improvement on levodopa until she reached 25/100 2 tablets 3 times daily.  On this dose she has had reduction in overall sensation of weakness, improved gait, but still has freezing of gait on initiation with turns.    Time once again spent discussing current clinical presentation of primary freezing of gait.  Given partial responsive numbness to levodopa we discussed the option of further increases.  We opted to increase to carbidopa/levodopa 2 tablets q. 8 AM, 2.5 tablets at 1 PM, and 2 tablets at 6 PM.    Would benefit from physical therapy for parkinsonian gait specifically ways to combat freezing of gait.  They have asked for referral to be sent to [facility].  Will start a trial of levodopa.  Instructed to take carbidopa/levodopa 25/100 half a tablet 3 times daily (every 6 hours apart from a.m. dose) x 1 week then 1 tablet 3 times daily x 1 week then 1.5 tablets 3 times daily.  Will reassess on medication.  If she develops any side effects they will contact the office.  If she develops nausea she is to take the medication with food.  Potential side effects such as nausea, hallucinations, lightheadedness and reviewed.

## 2024-04-04 ENCOUNTER — TELEPHONE (OUTPATIENT)
Dept: NEUROLOGY | Facility: CLINIC | Age: 74
End: 2024-04-04

## 2024-04-04 NOTE — TELEPHONE ENCOUNTER
Received 2:43 minute VM without transcription from 4/2/24, 11:33 AM:    Hello, my name is Roc Moctezuma. I'd like to leave a message for Dr. Fatima. My wife, Daisha Moctezuma is her pt. She was seen this morning by virtual visit. She ordered that physical therapy for parkinson's disease. If possible, we have the physical therapy in our town, [facility]. And I have the fax number. The name if the clinic is [facility] And the fax number of physical therapy is [phone] attn: Yuliya Banegas. My wife's name is Daisha Moctezuma. Date of birth May 28, 1950. Her cell phone number is [phone]. Thank you.  ----------------------------    Sent PT order to fax number [phone].     Spoke with pt and informed her of same. Pt appreciative.

## 2024-06-27 ENCOUNTER — OFFICE VISIT (OUTPATIENT)
Dept: NEUROLOGY | Facility: CLINIC | Age: 74
End: 2024-06-27
Payer: MEDICARE

## 2024-06-27 VITALS
HEART RATE: 84 BPM | WEIGHT: 126.4 LBS | BODY MASS INDEX: 22.39 KG/M2 | DIASTOLIC BLOOD PRESSURE: 60 MMHG | SYSTOLIC BLOOD PRESSURE: 108 MMHG

## 2024-06-27 DIAGNOSIS — G20.C PRIMARY PARKINSONISM: Primary | ICD-10-CM

## 2024-06-27 DIAGNOSIS — G47.09 OTHER INSOMNIA: ICD-10-CM

## 2024-06-27 PROCEDURE — 99214 OFFICE O/P EST MOD 30 MIN: CPT | Performed by: PSYCHIATRY & NEUROLOGY

## 2024-06-27 NOTE — PROGRESS NOTES
Review of Systems   Constitutional:  Negative for appetite change, fatigue and fever.   HENT: Negative.  Negative for hearing loss, tinnitus, trouble swallowing and voice change.    Eyes: Negative.  Negative for photophobia, pain and visual disturbance.   Respiratory: Negative.  Negative for shortness of breath.    Cardiovascular: Negative.  Negative for palpitations.   Gastrointestinal: Negative.  Negative for nausea and vomiting.   Endocrine: Negative.  Negative for cold intolerance.   Genitourinary: Negative.  Negative for dysuria, frequency and urgency.   Musculoskeletal:  Positive for gait problem (Has stayed the same). Negative for back pain, myalgias, neck pain and neck stiffness.        Balance Issues   Skin: Negative.  Negative for rash.   Allergic/Immunologic: Negative.    Neurological:  Negative for dizziness, tremors, seizures, syncope, facial asymmetry, speech difficulty, weakness, light-headedness, numbness and headaches.   Hematological: Negative.  Does not bruise/bleed easily.   Psychiatric/Behavioral: Negative.  Negative for confusion, hallucinations and sleep disturbance.    All other systems reviewed and are negative.

## 2024-06-27 NOTE — PROGRESS NOTES
Patient ID: Daisha Moctezuma is a 74 y.o. female    Assessment/Plan:    Primary parkinsonism  Parkinsonism with primary freezing of gait which has been about 80% responsive to levodopa.  She does have still have some freezing in certain situations have led to 3 falls in the last seen.  Overall appears to be doing quite well.  Ambulating quite well in the office.  Will continue carbidopa/levodopa 25/ 100  2 tablets 3 times daily.      Other insomnia  Difficulty with sleep maintenance.  Trazodone has helped with sleep initiation.  Reviewed sleep hygiene.      Diagnoses and all orders for this visit:    Primary parkinsonism  -     carbidopa-levodopa (SINEMET)  mg per tablet; 2 tablets 3 (three) times a day. Take 6 hours apart from am dose.    Other insomnia    Other orders  -     Melatonin 5 MG TABS; Take by mouth        Subjective:      Daisha Moctezuma is a 74 year old with parkinsonism with prominent freezing, who presents for movement follow up.     Seen February 24 with the development of shuffling and freezing on initiation and turns.  Exam with perhaps slight subtle decrement on right toe taps. Differential is an atypical initial presentation of idiopathic Parkinson disease versus atypical parkinsonism given prominent gait and freezing issues at onset.  We discussed differential for atypical parkinsonism including primary freezing of gait versus early symptoms of PSP.  She was started on carbidopa/levodopa 25/100 with 70 to 80% improvement in weakness noted once on 2 tablets 3 times a day.  Still having falls due to freezing.    Current medications:  Carbidopa/levodopa 25/100 2 tabs at 8am, 12pm, 5pm     Since last seen she has fallen 3 times. Once was shuffling on the carpet, another leaning forward. Last fall was at midnight in the dark.   She completed PT which was very helpful.   She still has some right leg discomfort. It is unclear if this fluctuates with medication.     No upper body symptoms. No tremor.    Speech is soft. There is no drooling. No difficulty chewing and swallowing.   Dressing and hygiene acts performed independently without difficulty.  There is no difficulty arising out of chair.      Sleep is interrupted. Trazodone 25mg helps her fall asleep but she awakens 3-4 am.       Review of initial history: Lower back pain radiating down her right leg present for over 5 years. Overtime pain in back resolved but she continues to have pain on the lateral aspect of the right calf and sole foot which fluctuates in intensity, improves with and worse when walking and standing. Epidural injections (5 injections) have not been effective. Physical therapy has not helped.  More recent change in gait with shorter stride with short turns. No issues with urination or cognition. Labs looking at B12, TSH, CMP, myasthenia gravis antibodies were unremarkable.  No history of REM sleep behavior disorder, restless legs syndrome or anosmia. EMG of right leg unremarkable. Seen in office in Dec 2023 with no clear progression.  Parkinsonian gait with freezing first noted at the February 2024 appointment. She was seen by a [facility] [place]rologist and testing including VOG was normal.          Objective:    /60 (BP Location: Left arm, Patient Position: Sitting, Cuff Size: Standard)   Pulse 84   Wt 57.3 kg (126 lb 6.4 oz)   BMI 22.39 kg/m²       Physical Exam  Eyes:      Extraocular Movements: Extraocular movements intact.      Pupils: Pupils are equal, round, and reactive to light.   Neurological:      Mental Status: She is alert.      Motor: Motor strength is normal.  Psychiatric:         Speech: Speech normal.         Neurological Exam  Mental Status  Alert. Oriented to person, place, time and situation. Speech is normal. Language is fluent with no aphasia. Attention and concentration are normal.    Cranial Nerves  CN III, IV, VI: Extraocular movements intact bilaterally. Pupils equal round and reactive  to light bilaterally.  CN VII: Full and symmetric facial movement.  CN VIII: Hearing is normal.  CN IX, X: Palate elevates symmetrically  CN XI: Shoulder shrug strength is normal.  CN XII: Tongue midline without atrophy or fasciculations.    Motor   Normal muscle tone. Strength is 5/5 throughout all four extremities.    Coordination  Right: Finger-to-nose normal.Left: Finger-to-nose normal.  See motor UPDRS    .    Gait   Able to rise from chair without using arms.         MDS UPDRS III                                       Time since last dose:    Speech  0   Facial Expression  0   Rigidity - Neck  0   Rigidity - Upper Extremity (R)  0   Rigidity - Upper Extremity (L)   0   Rigidity - Lower Extremity (R)  0   Rigidity - Lower Extremity (L)   0   Finger Taps (R)   0   Finger Taps (L)   0   Hand Movement (R)  0   Hand Movement (L)   0   Pronation/Supination (R)  0   Pronation/Supination (L)   0   Toe Tapping (R) 1>   Toe Tapping (L) 1   Leg Agility (R)  0   Leg Agility (L)   0   Arising from Chair   0   Gait   1   Freezing of Gait 0   Postural Stability   0   Posture 0   Global spontaneity of movement 0   Postural Tremor (Ri 0   Postural Tremor (L) 0   Kinetic Tremor (R)  0   Kinetic Tremor (L)  0   Rest tremor amplitude RUE 0   Rest tremor amplitude LUE 0   Rest tremor amplitude RLE 0   Reset tremor amplitude LLE 0   Lip/Jaw Tremor  0   Consistency of tremor 0   Motor Exam Total:               Carline Navarro MD  Movement disorder physician  [facility]

## 2024-07-09 NOTE — ASSESSMENT & PLAN NOTE
Parkinsonism with primary freezing of gait which has been about 80% responsive to levodopa.  She does have still have some freezing in certain situations have led to 3 falls in the last seen.  Overall appears to be doing quite well.  Ambulating quite well in the office.  Will continue carbidopa/levodopa 25/ 100  2 tablets 3 times daily.

## 2024-07-09 NOTE — ASSESSMENT & PLAN NOTE
Difficulty with sleep maintenance.  Trazodone has helped with sleep initiation.  Reviewed sleep hygiene.

## 2024-12-19 ENCOUNTER — OFFICE VISIT (OUTPATIENT)
Dept: NEUROLOGY | Facility: CLINIC | Age: 74
End: 2024-12-19
Payer: MEDICARE

## 2024-12-19 VITALS
DIASTOLIC BLOOD PRESSURE: 60 MMHG | BODY MASS INDEX: 22.67 KG/M2 | SYSTOLIC BLOOD PRESSURE: 112 MMHG | WEIGHT: 128 LBS | HEART RATE: 88 BPM

## 2024-12-19 DIAGNOSIS — G20.C PRIMARY PARKINSONISM (HCC): ICD-10-CM

## 2024-12-19 DIAGNOSIS — G47.09 OTHER INSOMNIA: Primary | ICD-10-CM

## 2024-12-19 PROCEDURE — G2211 COMPLEX E/M VISIT ADD ON: HCPCS | Performed by: PSYCHIATRY & NEUROLOGY

## 2024-12-19 PROCEDURE — 99214 OFFICE O/P EST MOD 30 MIN: CPT | Performed by: PSYCHIATRY & NEUROLOGY

## 2024-12-19 RX ORDER — CARBIDOPA AND LEVODOPA 25; 100 MG/1; MG/1
TABLET ORAL
Qty: 720 TABLET | Refills: 2 | Status: SHIPPED | OUTPATIENT
Start: 2024-12-19

## 2024-12-19 NOTE — PATIENT INSTRUCTIONS
Parkinson's disease with prominent difficulties at onset with freezing and festination which is responsive to levodopa. Festination can occur when tired and more so in the evening. Also with trouble with sleep initiation.     We will try increasing carbidopa/levodopa 25/100 to 2 tabs 4 times daily to see if this may improve nighttime symptoms and allow for sleep.

## 2024-12-19 NOTE — ASSESSMENT & PLAN NOTE
Difficulty with sleep initiation. Has been using melatonin. Will see if altering levodopa helps.

## 2024-12-19 NOTE — ASSESSMENT & PLAN NOTE
Parkinson's disease with prominent difficulties at onset with freezing and festination which is responsive to levodopa.  Level of levodopa responsiveness more typical of that seen with Parkinson disease.  She does have mild upper body bradykinesia.  Festination can occur when tired and more so in the evening. Also with trouble with sleep initiation.     We will try increasing carbidopa/levodopa 25/100 to 2 tabs 4 times daily to see if this may improve nighttime symptoms and allow for sleep.        Orders:    carbidopa-levodopa (SINEMET)  mg per tablet; 2 tablets 4 (three) times a day

## 2024-12-19 NOTE — PROGRESS NOTES
Review of Systems   Constitutional:  Negative for appetite change, fatigue and fever.   HENT:  Positive for voice change (not as clear). Negative for hearing loss, tinnitus and trouble swallowing.    Eyes: Negative.  Negative for photophobia, pain and visual disturbance.   Respiratory: Negative.  Negative for shortness of breath.    Cardiovascular: Negative.  Negative for palpitations.   Gastrointestinal: Negative.  Negative for nausea and vomiting.   Endocrine: Negative.  Negative for cold intolerance.   Genitourinary: Negative.  Negative for dysuria, frequency and urgency.   Musculoskeletal:  Positive for gait problem (Stutter steps, has gotten worse) and neck pain (a little bit but is ongoing). Negative for back pain, myalgias and neck stiffness.        Balance Issues  Falls  Posture Issues, Leans forward   Skin: Negative.  Negative for rash.   Allergic/Immunologic: Negative.    Neurological:  Negative for dizziness, tremors, seizures, syncope, facial asymmetry, speech difficulty, weakness, light-headedness, numbness and headaches.   Hematological: Negative.  Does not bruise/bleed easily.   Psychiatric/Behavioral:  Positive for sleep disturbance. Negative for confusion and hallucinations.    All other systems reviewed and are negative.

## 2024-12-19 NOTE — PROGRESS NOTES
Name: Daisha Moctezuma      : 1950      MRN: [id number]  Encounter Provider: Carline Navarro MD  Encounter Date: 2024   Encounter department: [facility] [place]  :  Assessment & Plan  Primary parkinsonism (HCC)  Parkinson's disease with prominent difficulties at onset with freezing and festination which is responsive to levodopa.  Level of levodopa responsiveness more typical of that seen with Parkinson disease.  She does have mild upper body bradykinesia.  Festination can occur when tired and more so in the evening. Also with trouble with sleep initiation.     We will try increasing carbidopa/levodopa 25/100 to 2 tabs 4 times daily to see if this may improve nighttime symptoms and allow for sleep.        Orders:    carbidopa-levodopa (SINEMET)  mg per tablet; 2 tablets 4 (three) times a day    Other insomnia  Difficulty with sleep initiation. Has been using melatonin. Will see if altering levodopa helps.              History of Present Illness   Daisha Moctezuma is a 74 year old with parkinsonism with prominent freezing, who presents for movement follow up.     Seen 2024 with the development of shuffling and freezing on initiation and turns.  Exam with perhaps slight subtle decrement on right toe taps. Differential is an atypical initial presentation of idiopathic Parkinson disease versus atypical parkinsonism given prominent gait and freezing issues at onset. Initially discussed differential for atypical parkinsonism including primary freezing of gait versus early symptoms of PSP. She was started on carbidopa/levodopa 25/100 with 70 to 80% improvement in weakness noted once on 2 tablets 3 times a day.  Still having falls due to freezing.    Current medications:  Carbidopa/levodopa 25/100 2 tabs at 8am, 12pm, 5pm   For sleep: melatonin nightly, trazodone on average 1-2 weekly, Xanax on average once weekly, Ambien rarely.     Presents today with continued gait  difficulty with a tendency to lean forward and freezing.  She had a few falls since last seen. She seems to have difficulty moving the right foot,. She tends to lean forward and they describes festination. This only occurs on occasion. If she is holding objects in both hands she can have a tendency to fall. Legs tend to feel heavy and weak. Symptoms are worse when tired and in the evening.    Waking day is from about 7:30am to about 12:30am-1pm.    She has difficulty with sleep initiation. She has tried trazodone but she feels it makes her leg heavier.   No dream enactment, witnessed apnea, or snoring.    Mild hypophonia.. Scheduled to see speech therapy.     Review of initial history: Lower back pain radiating down her right leg present for over 5 years. Overtime pain in back resolved but she continues to have pain on the lateral aspect of the right calf and sole foot which fluctuates in intensity, improves with and worse when walking and standing. Epidural injections (5 injections) have not been effective. Physical therapy has not helped.  More recent change in gait with shorter stride with short turns. No issues with urination or cognition. Labs looking at B12, TSH, CMP, myasthenia gravis antibodies were unremarkable.  No history of REM sleep behavior disorder, restless legs syndrome or anosmia. EMG of right leg unremarkable. Seen in office in Dec 2023 with no clear progression.  Parkinsonian gait with freezing first noted at the February 2024 appointment. She was seen by a [facility] [place]-Neurologist and testing including VOG was normal.        Review of Systems I have personally reviewed the MA's review of systems and made changes as necessary.         Objective   /60 (BP Location: Left arm, Patient Position: Sitting, Cuff Size: Standard)   Pulse 88   Wt 58.1 kg (128 lb)   BMI 22.67 kg/m²     Physical Exam  Vitals reviewed.   Eyes:      Extraocular Movements: Extraocular movements intact.       Pupils: Pupils are equal, round, and reactive to light.   Neurological:      Mental Status: She is alert.      Motor: Motor strength is normal.      Neurological Exam  Mental Status  Alert. Oriented to person, place, time and situation. Speech: hypophonia. Language is fluent with no aphasia. Attention and concentration are normal.    Cranial Nerves  CN III, IV, VI: Extraocular movements intact bilaterally. Pupils equal round and reactive to light bilaterally.  CN VII: Full and symmetric facial movement.  CN VIII: Hearing is normal.  CN IX, X: Palate elevates symmetrically  CN XI: Shoulder shrug strength is normal.  CN XII: Tongue midline without atrophy or fasciculations.    Motor   Normal muscle tone. Strength is 5/5 throughout all four extremities.    Coordination    See motor UPDRS    Mild decrement on FT and KAREEM .    Gait  Casual gait is normal including stance, stride, and arm swing. Able to rise from chair without using arms.       MDS UPDRS III                                       Time since last dose: 2 hours   Speech  1   Facial Expression  1   Rigidity - Neck     Rigidity - Upper Extremity (R)  0   Rigidity - Upper Extremity (L)   0   Rigidity - Lower Extremity (R)     Rigidity - Lower Extremity (L)      Finger Taps (R)   1   Finger Taps (L)   1   Hand Movement (R)  1   Hand Movement (L)   0   Pronation/Supination (R)  1   Pronation/Supination (L)   1   Toe Tapping (R) 1   Toe Tapping (L) 1   Leg Agility (R)  1   Leg Agility (L)   1   Arising from Chair   0   Gait   0   Freezing of Gait 0   Postural Stability      Posture 0   Global spontaneity of movement 0   Postural Tremor (Ri 0   Postural Tremor (L) 0   Kinetic Tremor (R)  0   Kinetic Tremor (L)  0   Rest tremor amplitude RUE 0   Rest tremor amplitude LUE 0   Rest tremor amplitude RLE 0   Reset tremor amplitude LLE 0   Lip/Jaw Tremor  0   Consistency of tremor 0   Motor Exam Total:

## 2025-04-06 DIAGNOSIS — G20.C PRIMARY PARKINSONISM (HCC): ICD-10-CM

## 2025-04-07 RX ORDER — CARBIDOPA AND LEVODOPA 25; 100 MG/1; MG/1
2 TABLET ORAL 4 TIMES DAILY
Qty: 720 TABLET | Refills: 2 | Status: SHIPPED | OUTPATIENT
Start: 2025-04-07

## 2025-06-11 ENCOUNTER — OFFICE VISIT (OUTPATIENT)
Dept: NEUROLOGY | Facility: CLINIC | Age: 75
End: 2025-06-11
Payer: MEDICARE

## 2025-06-11 VITALS
BODY MASS INDEX: 22.11 KG/M2 | OXYGEN SATURATION: 98 % | HEART RATE: 97 BPM | WEIGHT: 124.8 LBS | SYSTOLIC BLOOD PRESSURE: 122 MMHG | DIASTOLIC BLOOD PRESSURE: 76 MMHG | HEIGHT: 63 IN

## 2025-06-11 DIAGNOSIS — G47.09 OTHER INSOMNIA: ICD-10-CM

## 2025-06-11 DIAGNOSIS — G20.C PRIMARY PARKINSONISM (HCC): Primary | ICD-10-CM

## 2025-06-11 PROCEDURE — G2211 COMPLEX E/M VISIT ADD ON: HCPCS | Performed by: PSYCHIATRY & NEUROLOGY

## 2025-06-11 PROCEDURE — 99215 OFFICE O/P EST HI 40 MIN: CPT | Performed by: PSYCHIATRY & NEUROLOGY

## 2025-06-11 RX ORDER — ACETAMINOPHEN/DIPHENHYDRAMINE 500MG-25MG
TABLET ORAL AS NEEDED
COMMUNITY

## 2025-06-11 RX ORDER — ENTACAPONE 200 MG/1
200 TABLET ORAL 3 TIMES DAILY
COMMUNITY

## 2025-06-11 NOTE — PROGRESS NOTES
"Name: Daisha Moctezuma      : 1950      MRN: [id number]  Encounter Provider: Carline Navarro MD  Encounter Date: 2025   Encounter department: [facility] [place]  :  Assessment & Plan          History of Present Illness   HPI   Review of Systems   Constitutional:  Negative for chills and fever.   HENT:  Positive for voice change (Low volume). Negative for ear pain and sore throat.    Eyes:  Negative for pain and visual disturbance.   Respiratory:  Negative for cough and shortness of breath.    Cardiovascular:  Negative for chest pain and palpitations.   Gastrointestinal:  Negative for abdominal pain and vomiting.   Genitourinary:  Negative for dysuria and hematuria.   Musculoskeletal:  Positive for gait problem (freezing and shuffle). Negative for arthralgias and back pain.        Balance issues when walking   Skin:  Negative for color change and rash.   Neurological:  Positive for speech difficulty (very fast when speaking). Negative for seizures and syncope.   Psychiatric/Behavioral:  Positive for sleep disturbance (trouble going to sleep).    All other systems reviewed and are negative.   I have personally reviewed the MA's review of systems and made changes as necessary.         Objective   Ht 5' 3\" (1.6 m)   BMI 22.67 kg/m²     Physical Exam  Neurological Exam          "

## 2025-06-11 NOTE — ASSESSMENT & PLAN NOTE
Parkinson's disease with prominent difficulties at onset with freezing and festination which is responsive to levodopa.  Level of levodopa responsiveness more typical of that seen with Parkinson disease but she requires higher doses of levodopa for response only two years after onset which is atypical parkinsonian syndrome. She has yet to develop restrictions in gaze.  She has mild upper body bradykinesia. Recent falls due to freezing and festination particularly when she is late in taking levodopa. Since last seen she increase her carb/levodopa to 3 tabs 3 times daily along with entacapone. She is not sure if entacapone is helping.  She continues to have wearing off but is able to manage. She tried nighttime dose of levodopa but felt it did not help so stopped after a few days.     After much discussion about prognosis, diagnosis and treatment we opted to continue on current medication regimen.  She is to continue with physical therapy.  Started following at [facility] which is closer to her home.  She was encouraged to continue follow-up at [facility].  She requested to also have a 6-month follow-up at our office as well.

## 2025-06-11 NOTE — PROGRESS NOTES
Name: Daisha Moctezuma      : 1950      MRN: [id number]  Encounter Provider: Carline Navarro MD  Encounter Date: 2025   Encounter department: [facility] [place]  :  Assessment & Plan  Primary parkinsonism (HCC)  Parkinson's disease with prominent difficulties at onset with freezing and festination which is responsive to levodopa.  Level of levodopa responsiveness more typical of that seen with Parkinson disease but she requires higher doses of levodopa for response only two years after onset which is atypical parkinsonian syndrome. She has yet to develop restrictions in gaze.  She has mild upper body bradykinesia. Recent falls due to freezing and festination particularly when she is late in taking levodopa. Since last seen she increase her carb/levodopa to 3 tabs 3 times daily along with entacapone. She is not sure if entacapone is helping.  She continues to have wearing off but is able to manage. She tried nighttime dose of levodopa but felt it did not help so stopped after a few days.     After much discussion about prognosis, diagnosis and treatment we opted to continue on current medication regimen.  She is to continue with physical therapy.  Started following at [facility] which is closer to her home.  She was encouraged to continue follow-up at [facility].  She requested to also have a 6-month follow-up at our office as well.         Other insomnia  Chronic issues with insomnia.  Previous dose of levodopa at night was ineffective in treating this.  She finds Xanax partially effective when taken.  Trazodone was most effective at 25 mg nightly but she felt groggy and weak the next day.  After discussion her and her  wanted to try 1/4 tablet of trazodone which is reasonable if they can cut the pill.         Assessment & Plan          History of Present Illness   Daisha Moctezuma is a 75 year old with parkinsonism with prominent freezing, who presents for movement  follow up.      Seen February 2024 with the development of shuffling and freezing on initiation and turns.  Exam with perhaps slight subtle decrement on right toe taps. Differential is an atypical initial presentation of idiopathic Parkinson disease versus atypical parkinsonism given prominent gait and freezing issues at onset. Initially discussed differential for atypical parkinsonism including primary freezing of gait versus early symptoms of PSP. She was started on carbidopa/levodopa 25/100 with 70 to 80% improvement in weakness noted once on 2 tablets 3 times a day.  Still having falls due to freezing.     Current medications:  Carbidopa/levodopa 25/100 3 tabs at 8am, 12pm, 5pm   Entacapone 200mg 3 times daily      Prior med:   Carbidopa/levodopa ER 50/200 nightly - unclear if affected sleep?      For sleep: melatonin nightly, trazodone on average 1-2 weekly, Xanax on average once weekly, Ambien rarely.      Increased hypophonia.   Increase freezing and shuffling.  She fell while walking the other day and has a bruise on her chin. Falls are occurring every few days.   No tremor. She has micrographia.   Speech is softer and can speed up. Can be difficult to understand at times.  There is no drooling. No difficulty chewing and swallowing.   Dressing independently.  Hygiene acts performed independently without difficulty    She has trouble initiating sleep. Sleeps 3- 7 hours nightly.  She takes Xanax or melatonin or Tylenol PM as needed.  Trazodone 25mg causes drowsiness in am and she feels her legs are weak the next day.    There have been no symptoms of REM sleep behavior disorder no RLS.   There are no issues with urination. Constipation controlled with benefiber. .   There are no experiences with lightheadedness on standing.   Cognition is unchanged.   No hallucinations.      Review of initial history: Lower back pain radiating down her right leg present for over 5 years. Overtime pain in back resolved but she  continues to have pain on the lateral aspect of the right calf and sole foot which fluctuates in intensity, improves with and worse when walking and standing. Epidural injections (5 injections) have not been effective. Physical therapy has not helped.  More recent change in gait with shorter stride with short turns. No issues with urination or cognition. Labs looking at B12, TSH, CMP, myasthenia gravis antibodies were unremarkable.  No history of REM sleep behavior disorder, restless legs syndrome or anosmia. EMG of right leg unremarkable. Seen in office in Dec 2023 with no clear progression.  Parkinsonian gait with freezing first noted at the February 2024 appointment. She was seen by a [facility] [place]Neurologist and testing including VOG was normal.       History of Present Illness       Review of Systems I have personally reviewed the MA's review of systems and made changes as necessary.         Objective   There were no vitals taken for this visit.    Physical Exam    Eyes:      Extraocular Movements: Extraocular movements intact.      Pupils: Pupils are equal, round, and reactive to light.       Neurological:      Mental Status: She is alert.      Motor: Motor strength is normal.      Neurological Exam  Mental Status  Alert. Oriented to person, place, time and situation. Speech: hypophonia. Language is fluent with no aphasia. Attention and concentration are normal.    Cranial Nerves  CN III, IV, VI: Extraocular movements intact bilaterally. Pupils equal round and reactive to light bilaterally.  CN VII: Full and symmetric facial movement.  CN VIII: Hearing is normal.  CN IX, X: Palate elevates symmetrically  CN XI: Shoulder shrug strength is normal.  CN XII: Tongue midline without atrophy or fasciculations.    Motor   Normal muscle tone. Strength is 5/5 throughout all four extremities.    Coordination    See motor UPDRS    Mild decrement in finger taps , KAREEM and heel taps.    Gait   Retropulsed  and was unable to recover. Able to rise from chair without using arms.  Normal stride and speed. No freezing.  .    Physical Exam    MDS UPDRS III                              6/11/25   Time since last dose:    Speech  1   Facial Expression  0   Rigidity - Neck     Rigidity - Upper Extremity (R)  0   Rigidity - Upper Extremity (L)   0   Rigidity - Lower Extremity (R)  0   Rigidity - Lower Extremity (L)   0   Finger Taps (R)   1   Finger Taps (L)   1   Hand Movement (R)  0   Hand Movement (L)   0   Pronation/Supination (R)  1   Pronation/Supination (L)   1   Toe Tapping (R) 0   Toe Tapping (L) 1   Leg Agility (R)  0   Leg Agility (L)   0   Arising from Chair   0   Gait   0   Freezing of Gait 0   Postural Stability   2   Posture 0   Global spontaneity of movement 0   Postural Tremor (Ri 0   Postural Tremor (L) 0   Kinetic Tremor (R)  0   Kinetic Tremor (L)  0   Rest tremor amplitude RUE 0   Rest tremor amplitude LUE 0   Rest tremor amplitude RLE 0   Reset tremor amplitude LLE 0   Lip/Jaw Tremor  0   Consistency of tremor 0   Motor Exam Total:              Results        Administrative Statements   I have spent a total time of 45 minutes in caring for this patient on the day of the visit/encounter including Risks and benefits of tx options, Patient and family education, Impressions, Documenting in the medical record, and Obtaining or reviewing history  .

## 2025-06-11 NOTE — ASSESSMENT & PLAN NOTE
Chronic issues with insomnia.  Previous dose of levodopa at night was ineffective in treating this.  She finds Xanax partially effective when taken.  Trazodone was most effective at 25 mg nightly but she felt groggy and weak the next day.  After discussion her and her  wanted to try 1/4 tablet of trazodone which is reasonable if they can cut the pill.

## 2025-06-11 NOTE — PATIENT INSTRUCTIONS
Continue on carb/levodopa 25/100 3 tabs 3 times daily  They plan on taking a lower dose of  trazodone 1/4 tablet nightly